# Patient Record
Sex: MALE | Race: OTHER | Employment: FULL TIME | URBAN - METROPOLITAN AREA
[De-identification: names, ages, dates, MRNs, and addresses within clinical notes are randomized per-mention and may not be internally consistent; named-entity substitution may affect disease eponyms.]

---

## 2019-05-26 ENCOUNTER — HOSPITAL ENCOUNTER (EMERGENCY)
Facility: HOSPITAL | Age: 29
Discharge: HOME | End: 2019-05-27
Attending: EMERGENCY MEDICINE

## 2019-05-26 VITALS
RESPIRATION RATE: 16 BRPM | SYSTOLIC BLOOD PRESSURE: 131 MMHG | OXYGEN SATURATION: 99 % | DIASTOLIC BLOOD PRESSURE: 72 MMHG | WEIGHT: 170 LBS | HEART RATE: 87 BPM | TEMPERATURE: 98 F

## 2019-05-26 DIAGNOSIS — L02.01 FACIAL ABSCESS: Primary | ICD-10-CM

## 2019-05-26 PROCEDURE — 99283 EMERGENCY DEPT VISIT LOW MDM: CPT

## 2019-05-26 PROCEDURE — 10061 I&D ABSCESS COMP/MULTIPLE: CPT

## 2019-05-27 RX ORDER — CLINDAMYCIN HYDROCHLORIDE 300 MG/1
300 CAPSULE ORAL 3 TIMES DAILY
Qty: 30 CAPSULE | Refills: 0 | Status: SHIPPED | OUTPATIENT
Start: 2019-05-27 | End: 2019-06-06

## 2019-06-04 NOTE — ED PROVIDER NOTES
Patient Seen in: [facility] AND [facility] Emergency Department    History   Patient presents with:  Abscess (integumentary)    Stated Complaint: swelling to left side of face    HPI    26 yo male with painful swelling to the left side of the face.  He punctured pus expressed. Abscess opened with 11 blade and further pus expressed. Packed with gauze. Patient advised that he may need more definitive treatment receommended by plastic surgery. Will start antibiotics and prompt follow up.              Disposition and P

## (undated) NOTE — ED AVS SNAPSHOT
Ollie Nyhan   MRN: [id number]    Department:  [facility] Emergency Department   Date of Visit:  5/26/2019           Disclosure     Insurance plans vary and the physician(s) referred by the ER may not be covered by your plan.  Please contact your CARE PHYSICIAN AT ONCE OR RETURN IMMEDIATELY TO THE EMERGENCY DEPARTMENT. If you have been prescribed any medication(s), please fill your prescription right away and begin taking the medication(s) as directed.   If you believe that any of the medications